# Patient Record
Sex: MALE | Race: WHITE
[De-identification: names, ages, dates, MRNs, and addresses within clinical notes are randomized per-mention and may not be internally consistent; named-entity substitution may affect disease eponyms.]

---

## 2019-04-30 PROBLEM — Z00.00 ENCOUNTER FOR PREVENTIVE HEALTH EXAMINATION: Status: ACTIVE | Noted: 2019-04-30

## 2019-05-09 ENCOUNTER — APPOINTMENT (OUTPATIENT)
Dept: NEUROLOGY | Facility: CLINIC | Age: 49
End: 2019-05-09
Payer: COMMERCIAL

## 2019-05-09 VITALS
WEIGHT: 240 LBS | DIASTOLIC BLOOD PRESSURE: 83 MMHG | TEMPERATURE: 98 F | HEIGHT: 69 IN | SYSTOLIC BLOOD PRESSURE: 134 MMHG | HEART RATE: 72 BPM | BODY MASS INDEX: 35.55 KG/M2

## 2019-05-09 DIAGNOSIS — Z98.890 OTHER SPECIFIED POSTPROCEDURAL STATES: ICD-10-CM

## 2019-05-09 DIAGNOSIS — Z83.3 FAMILY HISTORY OF DIABETES MELLITUS: ICD-10-CM

## 2019-05-09 DIAGNOSIS — G56.20 LESION OF ULNAR NERVE, UNSPECIFIED UPPER LIMB: ICD-10-CM

## 2019-05-09 DIAGNOSIS — Z86.59 PERSONAL HISTORY OF OTHER MENTAL AND BEHAVIORAL DISORDERS: ICD-10-CM

## 2019-05-09 DIAGNOSIS — Z87.81 PERSONAL HISTORY OF (HEALED) TRAUMATIC FRACTURE: ICD-10-CM

## 2019-05-09 DIAGNOSIS — Z78.9 OTHER SPECIFIED HEALTH STATUS: ICD-10-CM

## 2019-05-09 DIAGNOSIS — G56.00 CARPAL TUNNEL SYNDROME, UNSPECIFIED UPPER LIMB: ICD-10-CM

## 2019-05-09 PROCEDURE — 99244 OFF/OP CNSLTJ NEW/EST MOD 40: CPT

## 2019-05-09 RX ORDER — OLANZAPINE 7.5 MG/1
7.5 TABLET, FILM COATED ORAL
Refills: 0 | Status: ACTIVE | COMMUNITY

## 2019-05-09 RX ORDER — ASCORBIC ACID, CHOLECALCIFEROL, DL-.ALPHA.-TOCOPHEROL ACETATE, THIAMINE HYDROCHLORIDE, RIBOFLAVIN, NIACINAMIDE, PYRIDOXINE HYDROCHLORIDE, FOLIC ACID, CYANOCOBALAMIN, CALCIUM CARBONATE, FERROUS FUMARATE, ZINC OXIDE, CUPRIC SULFATE 100; 400; 30; 1.6; 1.6; 20; 3.1; 1; 12; 200; 27; 10; 2 MG/1; [IU]/1; [IU]/1; MG/1; MG/1; MG/1; MG/1; MG/1; UG/1; MG/1; MG/1; MG/1; MG/1
TABLET, COATED ORAL
Refills: 0 | Status: ACTIVE | COMMUNITY

## 2019-05-09 RX ORDER — FLUOXETINE HYDROCHLORIDE 40 MG/1
40 CAPSULE ORAL
Refills: 0 | Status: ACTIVE | COMMUNITY

## 2019-05-09 RX ORDER — BUPROPION HYDROCHLORIDE 100 MG/1
100 TABLET, FILM COATED ORAL
Refills: 0 | Status: ACTIVE | COMMUNITY

## 2019-05-09 NOTE — ASSESSMENT
[FreeTextEntry1] : Advised the patient to wear a wrist splint at night. Will schedule electrodiagnostic testing.

## 2019-05-09 NOTE — REASON FOR VISIT
[Acute] : an acute visit [FreeTextEntry1] : Left arm tingling in the thumb and finger started couple month ago

## 2019-05-09 NOTE — CONSULT LETTER
[Dear  ___] : Dear  [unfilled], [Please see my note below.] : Please see my note below. [Consult Letter:] : I had the pleasure of evaluating your patient, [unfilled]. [FreeTextEntry3] : Sincerely,\par \par Vel Klein M.D.\par

## 2019-05-09 NOTE — PHYSICAL EXAM
[FreeTextEntry1] : Physical examination \par General: No acute distress, Awake, Alert. \par Fundus: Unable\par Neck: no Carotid bruit. \par Cardiovascular: Normal rate, Regular rhythm, No murmur. \par Chest - clear bilaterally\par \par Mental status \par Awake, alert, and oriented to person, time and place, Normal attention span and concentration, Recent and remote memory intact, Language intact, Fund of knowledge intact. \par Cranial Nerves \par II: VFF \par III, IV, VI: PERRL, EOMI. \par V: Facial sensation is normal B/L. \par VII: Facial strength is normal B/L. \par VIII: Gross hearing is intact. \par IX, X: Palate is midline and elevates symmetrically. \par XI: Trapezius normal strength. \par XII: Tongue midline without atrophy or fasciculations. \par \par Motor exam \par Muscle tone - no evidence of rigidity or resistance in all 4 extremities. \par No atrophy or fasciculations \par Muscle Strength: arms and legs, proximal and distal flexors and extensors are normal \par No UE drift.\par \par Reflexes \par All present, normal, and symmetrical. \par \par Coordination \par Finger to nose: Normal. \par Heel to shin: Normal. \par \par Sensory \par Intact sensation to vibration and cold.\par \par Gait \par Normal including heels, toes, and tandem gait. \par \par Negative Phalen's sign. Negative Tinel's.\par

## 2019-05-09 NOTE — HISTORY OF PRESENT ILLNESS
[FreeTextEntry1] : This is a 49-year-old left-handed man who is being seen in neurologic consultation for a 3 week history of numbness and tingling involving the left digits one and 2. No recent trauma history. Does sleep with his arms under the pillow and bent often. Has a job which requires him to use a computer and type a lot. Endorses pins and needles and digits one and 2. No neck pain. No loss of strength or weakness. He is not waking up in the middle of the night having to shake out his hands currently. Has a history of carpal tunnel release surgery about 3 years ago on the same side.

## 2019-05-09 NOTE — REVIEW OF SYSTEMS
[Numbness] : numbness [Depression] : depression [Negative] : Heme/Lymph [FreeTextEntry2] : change in weight,allergies [FreeTextEntry8] : night time urination,erectile dysfunction

## 2019-06-19 ENCOUNTER — APPOINTMENT (OUTPATIENT)
Dept: NEUROLOGY | Facility: CLINIC | Age: 49
End: 2019-06-19
Payer: COMMERCIAL

## 2019-06-19 DIAGNOSIS — G56.02 CARPAL TUNNEL SYNDROME, LEFT UPPER LIMB: ICD-10-CM

## 2019-06-19 PROCEDURE — 95886 MUSC TEST DONE W/N TEST COMP: CPT

## 2019-06-19 PROCEDURE — 95909 NRV CNDJ TST 5-6 STUDIES: CPT

## 2019-06-19 NOTE — PROCEDURE
[FreeTextEntry3] : EMG/NCS Performed.\par Please see scanned document in the Neurology section for full report.\par

## 2020-02-28 ENCOUNTER — TRANSCRIPTION ENCOUNTER (OUTPATIENT)
Age: 50
End: 2020-02-28

## 2020-06-02 PROBLEM — Z00.00 ENCOUNTER FOR PREVENTIVE HEALTH EXAMINATION: Status: ACTIVE | Noted: 2020-06-02

## 2020-06-08 ENCOUNTER — APPOINTMENT (OUTPATIENT)
Dept: NEUROLOGY | Facility: CLINIC | Age: 50
End: 2020-06-08

## 2020-06-08 ENCOUNTER — APPOINTMENT (OUTPATIENT)
Dept: NEUROLOGY | Facility: CLINIC | Age: 50
End: 2020-06-08
Payer: COMMERCIAL

## 2020-06-08 PROCEDURE — 99213 OFFICE O/P EST LOW 20 MIN: CPT | Mod: 95

## 2020-06-10 NOTE — ASSESSMENT
[FreeTextEntry1] : Since the alteration in medication dosing and lowering of some of his medications, patient's symptoms and tremor have resolved. I suspect he had drug induced tremors. He is going to monitor his tremors and let me know how he is doing in about 4 weeks.

## 2020-06-10 NOTE — HISTORY OF PRESENT ILLNESS
[Home] : at home, [unfilled] , at the time of the visit. [Other Location: e.g. Home (Enter Location, City,State)___] : at [unfilled] [Verbal consent obtained from patient] : the patient, [unfilled] [FreeTextEntry1] : Attendance had been experiencing tremors in his legs and occasionally in his hands for the last few months. He states his friends were noticing the tremors. He would have difficulty standing straight for a long time and with slight to the floor. He has no low back pain and no numbness in his legs. He has no neck pain and no numbness in his arms or hands. He has no incontinence. Patient has not had any headaches either. He recently saw   his psychiatrist on Friday. His olanzapine was lowered. Just in the last few days he describes feeling 100 times better. The tremors have essentially resolved.\par Medications include Lamictal, olanzapine, desipramine, lithium, Prozac, and Cogentin.

## 2020-06-10 NOTE — PHYSICAL EXAM
[FreeTextEntry1] : Physical examination \par General: No acute distress, Awake, Alert. \par \par Mental status \par Awake, alert, and oriented to person, time and place, Normal attention span and concentration, Recent and remote memory intact, Language intact, Fund of knowledge intact. \par \par No tremors with hands outstretched- \par \par Gait\par stable

## 2020-06-17 DIAGNOSIS — R25.1 TREMOR, UNSPECIFIED: ICD-10-CM

## 2020-07-08 ENCOUNTER — APPOINTMENT (OUTPATIENT)
Dept: NEUROLOGY | Facility: CLINIC | Age: 50
End: 2020-07-08
Payer: COMMERCIAL

## 2020-07-08 DIAGNOSIS — G25.1 DRUG-INDUCED TREMOR: ICD-10-CM

## 2020-07-08 PROCEDURE — 99213 OFFICE O/P EST LOW 20 MIN: CPT | Mod: 95

## 2020-07-09 PROBLEM — G25.1 DRUG-INDUCED TREMOR: Status: ACTIVE | Noted: 2020-06-10

## 2020-07-09 NOTE — PHYSICAL EXAM
[FreeTextEntry1] : Physical examination \par General: No acute distress, Awake, Alert. \par \par Mental status \par Awake, alert, and oriented to person, time and place, Normal attention span and concentration, Recent and remote memory intact, Language intact, Fund of knowledge intact. \par \par Bilateral tremor with hands outstretched very mild in degree.\par \par Gait is stable.

## 2020-07-09 NOTE — ASSESSMENT
[FreeTextEntry1] : I suspect his tremor is more drug-induced as he continues to improve even per our last visit. For now we'll cancel the MRI. If his symptoms come back, I will consider further diagnostic imaging. He will continue to follow with psychiatry.

## 2020-07-09 NOTE — HISTORY OF PRESENT ILLNESS
[FreeTextEntry1] : Patient is here in followup. He reports overall improvement in his tremors. He says he is much better. He's not had any shakes. No recent falls. He is following with psychiatry at the end of the month the lithium is point to be reduced and eventually discontinued. He has no other focal or lateralizing loss of strength. No tremors in his upper extremities as of now. We were unable to get his MRI proved.

## 2020-11-07 ENCOUNTER — TRANSCRIPTION ENCOUNTER (OUTPATIENT)
Age: 50
End: 2020-11-07

## 2020-11-08 ENCOUNTER — TRANSCRIPTION ENCOUNTER (OUTPATIENT)
Age: 50
End: 2020-11-08

## 2020-11-12 ENCOUNTER — TRANSCRIPTION ENCOUNTER (OUTPATIENT)
Age: 50
End: 2020-11-12

## 2021-04-12 ENCOUNTER — TRANSCRIPTION ENCOUNTER (OUTPATIENT)
Age: 51
End: 2021-04-12

## 2021-08-15 ENCOUNTER — TRANSCRIPTION ENCOUNTER (OUTPATIENT)
Age: 51
End: 2021-08-15

## 2021-12-28 ENCOUNTER — RESULT REVIEW (OUTPATIENT)
Age: 51
End: 2021-12-28

## 2024-09-12 DIAGNOSIS — M54.2 CERVICALGIA: ICD-10-CM

## 2024-09-12 DIAGNOSIS — M54.6 PAIN IN THORACIC SPINE: ICD-10-CM

## 2024-09-15 ENCOUNTER — RESULT REVIEW (OUTPATIENT)
Age: 54
End: 2024-09-15

## 2024-09-16 ENCOUNTER — APPOINTMENT (OUTPATIENT)
Facility: CLINIC | Age: 54
End: 2024-09-16
Payer: COMMERCIAL

## 2024-09-16 VITALS — WEIGHT: 228 LBS | BODY MASS INDEX: 33.77 KG/M2 | HEIGHT: 69 IN

## 2024-09-16 DIAGNOSIS — M42.00 JUVENILE OSTEOCHONDROSIS OF SPINE, SITE UNSPECIFIED: ICD-10-CM

## 2024-09-16 DIAGNOSIS — M42.04: ICD-10-CM

## 2024-09-16 PROCEDURE — G2211 COMPLEX E/M VISIT ADD ON: CPT | Mod: NC

## 2024-09-16 PROCEDURE — 99204 OFFICE O/P NEW MOD 45 MIN: CPT

## 2024-09-16 NOTE — HISTORY OF PRESENT ILLNESS
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  The patient reports some balance issues.  He has some mid back pain.  He reports having this for several weeks.  He denies having major issues when he was younger.  He denies any trauma.  He does have some pain in his back with tingling into the feet.  He does not have the classic radicular symptoms.  Has no weakness.  He has been doing some physical therapy.  Has no issues with gait or balance or bowel bladder incontinence.

## 2024-09-16 NOTE — ASSESSMENT
[FreeTextEntry1] : I discussed at length with the patient nature of the condition.  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  His radiographs today demonstrate significant kyphosis of the thoracic spine, approaching 100 degrees.  This resulted in significant sagittal imbalance of the spine.  At this time we will get an MRI and a CAT scan to evaluate the thoracic spine.  The patient will be continue with physical therapy, periscapular stabilization.  I do believe some of his deformity is rigid.  I do believe that physical therapy will help with the symptoms, however it will not address the magnitude of his structural deformity.  As long as his symptoms are tolerable, he will continue with physical therapy medications.  However, if he has worsening back pain, postural issues, balance issues, he will be a candidate for surgical intervention.  All the patient's questions were sought and answered.

## 2024-09-16 NOTE — PHYSICAL EXAM
[de-identified] : patient is NAD, AAOX3 skin is intact, NTTP in LS SPINE mild pain with ROM 5/5 motor strength in BLE SILT L1-S1 BLE POSITIVE straight leg raise Negative yuridia equal patella/achilles reflex normal gait forward stopped posture.  unable to lay flat against wall [de-identified] : Imaging demonstrates 100 degree thoracic kyphosis.  There is significant positive sagittal imbalance

## 2024-10-27 ENCOUNTER — NON-APPOINTMENT (OUTPATIENT)
Age: 54
End: 2024-10-27

## 2024-11-05 RX ORDER — DIAZEPAM 5 MG/1
5 TABLET ORAL
Qty: 3 | Refills: 0 | Status: ACTIVE | COMMUNITY
Start: 2024-11-05 | End: 1900-01-01

## 2024-11-06 ENCOUNTER — RESULT REVIEW (OUTPATIENT)
Age: 54
End: 2024-11-06

## 2024-11-12 ENCOUNTER — APPOINTMENT (OUTPATIENT)
Facility: CLINIC | Age: 54
End: 2024-11-12
Payer: COMMERCIAL

## 2024-11-12 VITALS — HEIGHT: 69 IN | BODY MASS INDEX: 33.77 KG/M2 | WEIGHT: 228 LBS

## 2024-11-12 DIAGNOSIS — M42.04: ICD-10-CM

## 2024-11-12 PROCEDURE — G2211 COMPLEX E/M VISIT ADD ON: CPT | Mod: NC

## 2024-11-12 PROCEDURE — 99214 OFFICE O/P EST MOD 30 MIN: CPT

## 2024-12-10 ENCOUNTER — APPOINTMENT (OUTPATIENT)
Facility: CLINIC | Age: 54
End: 2024-12-10
Payer: COMMERCIAL

## 2024-12-10 VITALS
WEIGHT: 228 LBS | HEIGHT: 69 IN | DIASTOLIC BLOOD PRESSURE: 82 MMHG | OXYGEN SATURATION: 100 % | BODY MASS INDEX: 33.77 KG/M2 | SYSTOLIC BLOOD PRESSURE: 131 MMHG | HEART RATE: 64 BPM

## 2024-12-10 DIAGNOSIS — M42.04: ICD-10-CM

## 2024-12-10 PROCEDURE — 99215 OFFICE O/P EST HI 40 MIN: CPT

## 2024-12-10 PROCEDURE — G2211 COMPLEX E/M VISIT ADD ON: CPT | Mod: NC

## 2024-12-30 DIAGNOSIS — Z01.818 ENCOUNTER FOR OTHER PREPROCEDURAL EXAMINATION: ICD-10-CM

## 2025-01-04 ENCOUNTER — NON-APPOINTMENT (OUTPATIENT)
Age: 55
End: 2025-01-04

## 2025-01-07 ENCOUNTER — APPOINTMENT (OUTPATIENT)
Dept: NEUROSURGERY | Facility: CLINIC | Age: 55
End: 2025-01-07
Payer: COMMERCIAL

## 2025-01-07 ENCOUNTER — NON-APPOINTMENT (OUTPATIENT)
Age: 55
End: 2025-01-07

## 2025-01-07 VITALS — SYSTOLIC BLOOD PRESSURE: 139 MMHG | OXYGEN SATURATION: 96 % | HEART RATE: 62 BPM | DIASTOLIC BLOOD PRESSURE: 80 MMHG

## 2025-01-07 DIAGNOSIS — M42.00 JUVENILE OSTEOCHONDROSIS OF SPINE, SITE UNSPECIFIED: ICD-10-CM

## 2025-01-07 PROCEDURE — 99205 OFFICE O/P NEW HI 60 MIN: CPT
